# Patient Record
Sex: MALE | Race: WHITE | Employment: FULL TIME | URBAN - NONMETROPOLITAN AREA
[De-identification: names, ages, dates, MRNs, and addresses within clinical notes are randomized per-mention and may not be internally consistent; named-entity substitution may affect disease eponyms.]

---
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
## 2020-06-03 ENCOUNTER — APPOINTMENT (OUTPATIENT)
Dept: GENERAL RADIOLOGY | Age: 20
End: 2020-06-03
Payer: COMMERCIAL

## 2020-06-03 ENCOUNTER — HOSPITAL ENCOUNTER (EMERGENCY)
Age: 20
Discharge: HOME | End: 2020-06-04
Payer: COMMERCIAL

## 2020-06-03 VITALS
WEIGHT: 150 LBS | HEART RATE: 114 BPM | BODY MASS INDEX: 21.47 KG/M2 | TEMPERATURE: 98.3 F | RESPIRATION RATE: 22 BRPM | DIASTOLIC BLOOD PRESSURE: 78 MMHG | HEIGHT: 70 IN | SYSTOLIC BLOOD PRESSURE: 111 MMHG | OXYGEN SATURATION: 98 %

## 2020-06-03 PROCEDURE — 73130 X-RAY EXAM OF HAND: CPT

## 2020-06-03 PROCEDURE — 99281 EMR DPT VST MAYX REQ PHY/QHP: CPT

## 2020-06-03 PROCEDURE — 2709999900 HC NON-CHARGEABLE SUPPLY

## 2020-06-03 PROCEDURE — 12002 RPR S/N/AX/GEN/TRNK2.6-7.5CM: CPT

## 2020-06-03 RX ORDER — LIDOCAINE HYDROCHLORIDE 10 MG/ML
INJECTION, SOLUTION INFILTRATION; PERINEURAL
Status: DISCONTINUED
Start: 2020-06-03 | End: 2020-06-04 | Stop reason: HOSPADM

## 2020-06-03 ASSESSMENT — PAIN SCALES - GENERAL: PAINLEVEL_OUTOF10: 1

## 2020-06-03 ASSESSMENT — ENCOUNTER SYMPTOMS
ABDOMINAL DISTENTION: 0
EYE REDNESS: 0
BLOOD IN STOOL: 0
VOICE CHANGE: 0
VOMITING: 0
COLOR CHANGE: 0
DIARRHEA: 0
BACK PAIN: 0
CONSTIPATION: 0
SHORTNESS OF BREATH: 0
SORE THROAT: 0
COUGH: 0
WHEEZING: 0
ABDOMINAL PAIN: 0
SINUS PRESSURE: 0
RHINORRHEA: 0
CHEST TIGHTNESS: 0
NAUSEA: 0
PHOTOPHOBIA: 0

## 2020-06-04 NOTE — ED PROVIDER NOTES
[facility] Emergency Department    CHIEF COMPLAINT       Chief Complaint   Patient presents with    Laceration     right hand       Nurses Notes reviewed and I agree except as noted in the HPI. HISTORY OF PRESENT ILLNESS    [address] jeny 21 y.o. male who presents to the ED for evaluation of laceration to the right hand. Patient states he was at home washing dishes when a plate broke and he grabbed it and cut his hand. Patient rates his pain 1/10 in severity. Patient has a 5 cm laceration to his mid hand and he is unable to flex his middle finger. However, he can extend it against the doctor. Patient has no active bleeding at this time. Patient has no other complaints at this time. Pain description:  Onset: PTA  Location: Right hand  Duration: Constant  Character: None  Aggravating factors: None  Radiation: None  Timing: PTA  Severity: 1/10    HPI was provided by the patient. REVIEW OF SYSTEMS     Review of Systems   Constitutional: Negative for appetite change, chills, diaphoresis, fatigue, fever and unexpected weight change. HENT: Negative for congestion, hearing loss, postnasal drip, rhinorrhea, sinus pressure, sore throat and voice change. Eyes: Negative for photophobia, redness and visual disturbance. Respiratory: Negative for cough, chest tightness, shortness of breath and wheezing. Cardiovascular: Negative for chest pain and palpitations. Gastrointestinal: Negative for abdominal distention, abdominal pain, blood in stool, constipation, diarrhea, nausea and vomiting. Endocrine: Negative for cold intolerance, heat intolerance, polydipsia, polyphagia and polyuria. Genitourinary: Negative for decreased urine volume, difficulty urinating, dysuria, flank pain and frequency. Musculoskeletal: Negative for arthralgias, back pain, gait problem, joint swelling, neck pain and neck stiffness. Skin: Positive for wound (laceration to the right hand).  Negative for color change and and is not actively bleeding. Patient's laceration is proximal to his index, middle, and ring finger. Neurological:      Mental Status: He is alert and oriented to person, place, and time. Psychiatric:         Behavior: Behavior normal.         Thought Content: Thought content normal.         Judgment: Judgment normal.           DIFFERENTIAL DIAGNOSIS:   Including but not limited to: foreign body, tendon laceration and laceration. DIAGNOSTIC RESULTS     EKG: All EKG's are interpreted by the Emergency Department Physician who eithersigns or Co-signs this chart in the absence of a cardiologist.    None    RADIOLOGY: non-plainfilm images(s) such as CT, Ultrasound and MRI are read by the radiologist.  Plain radiographic images are visualized and preliminarily interpreted by the emergency physician unless otherwise stated below. XR HAND RIGHT (MIN 3 VIEWS)   Final Result   1. Negative exam for fracture. 2. No obvious soft tissue foreign body retention is demonstrated. **This report has been created using voice recognition software. It may contain minor errors which are inherent in voice recognition technology. **      Final report electronically signed by Dr. Josy No on 6/3/2020 10:16 PM            LABS:   Labs Reviewed - No data to display    EMERGENCY DEPARTMENT COURSE:   Vitals:    Vitals:    06/03/20 2128   BP: 111/78   Pulse: 114   Resp: 22   Temp: 98.3 °F (36.8 °C)   TempSrc: Oral   SpO2: 98%   Weight: 150 lb (68 kg)   Height: 5' 10\" (1.778 m)     MDM    Patient was seen and evaluated in the emergency department, patient appeared to be no acute distress, vital signs were reviewed, tachycardia and tachypnea were noted although not noted on physical exam.  Physical exam was completed, he has a laceration to the right hand, this is of the palmar surface, it is proximal to the first second and third digits, it is mid hand.   He is unable to flex the middle finger, he has no difficulty extending